# Patient Record
Sex: MALE | Race: BLACK OR AFRICAN AMERICAN | Employment: FULL TIME | URBAN - METROPOLITAN AREA
[De-identification: names, ages, dates, MRNs, and addresses within clinical notes are randomized per-mention and may not be internally consistent; named-entity substitution may affect disease eponyms.]

---

## 2021-09-28 ENCOUNTER — OFFICE VISIT (OUTPATIENT)
Dept: SURGERY | Age: 56
End: 2021-09-28
Payer: OTHER GOVERNMENT

## 2021-09-28 DIAGNOSIS — L91.8 SKIN TAG: Primary | ICD-10-CM

## 2021-09-28 PROCEDURE — 99202 OFFICE O/P NEW SF 15 MIN: CPT | Performed by: SURGERY

## 2021-09-28 NOTE — PROGRESS NOTES
Kay Rasheed is a 60 y.o. male who is referred by [facility] for further evaluation of a skin tag of the right inner thigh. Mr. Teofilo Pickens tells me that he noted a skin lesion on his right inner thigh some time ago. The skin lesion has become progressively larger and more bothersome to him. No associated drainage or bleeding. Found to have a skin tag. He has otherwise been in his usual state of health. Past Medical History:   Diagnosis Date    Skin tag 9/28/2021     History reviewed. No pertinent surgical history. History reviewed. No pertinent family history. Social History     Socioeconomic History    Marital status:      Spouse name: Not on file    Number of children: Not on file    Years of education: Not on file    Highest education level: Not on file     Social Determinants of Health     Financial Resource Strain:     Difficulty of Paying Living Expenses:    Food Insecurity:     Worried About Running Out of Food in the Last Year:     [address] in the Last Year:    Transportation Needs:     Lack of Transportation (Medical):  Lack of Transportation (Non-Medical):    Physical Activity:     Days of Exercise per Week:     Minutes of Exercise per Session:    Stress:     Feeling of Stress :    Social Connections:     Frequency of Communication with Friends and Family:     Frequency of Social Gatherings with Friends and Family:     Attends Rastafari Services:     Active Member of Clubs or Organizations:     Attends Club or Organization Meetings:     Marital Status:      Review of systems negative except as noted. Review of Systems   Musculoskeletal:        Discomfort at site of skin tag. Physical Exam  Vitals reviewed. Constitutional:       General: He is not in acute distress. Appearance: Normal appearance. HENT:      Head: Normocephalic and atraumatic. Eyes:      General: No scleral icterus.   Cardiovascular:      Rate and Rhythm: Normal rate and regular rhythm. Pulmonary:      Effort: Pulmonary effort is normal.      Breath sounds: Normal breath sounds. Musculoskeletal:         General: Normal range of motion. Skin:     Comments: On the right inner thigh, there is a pedunculated skin tag that measures approximately 2 cm x 2 cm. Neurological:      General: No focal deficit present. Mental Status: He is alert. ASSESSMENT and PLAN  Mr. Del Casarez is a 59 yo man with a skin tag of the right inner thigh. In view of the findings on H and P, he should benefit from excision of the skin tag as it is bothersome to him. Discussed procedure with Mr. Del Casarez including risks of bleeding, infection, need for further surgery, recurrence. He understands and wishes to proceed. Mr. Del Casarez will return at his convenience for excision of the skin tag in the office.        CC: [employer]

## 2021-09-30 VITALS
HEART RATE: 87 BPM | DIASTOLIC BLOOD PRESSURE: 78 MMHG | TEMPERATURE: 98.8 F | WEIGHT: 257 LBS | OXYGEN SATURATION: 98 % | RESPIRATION RATE: 16 BRPM | SYSTOLIC BLOOD PRESSURE: 136 MMHG

## 2021-09-30 RX ORDER — CELECOXIB 100 MG/1
CAPSULE ORAL
COMMUNITY
Start: 2021-07-22

## 2021-09-30 RX ORDER — VALACYCLOVIR HYDROCHLORIDE 500 MG/1
TABLET, FILM COATED ORAL 2 TIMES DAILY
COMMUNITY

## 2021-09-30 RX ORDER — GABAPENTIN 300 MG/1
CAPSULE ORAL
COMMUNITY
Start: 2021-06-30

## 2021-10-12 ENCOUNTER — OFFICE VISIT (OUTPATIENT)
Dept: SURGERY | Age: 56
End: 2021-10-12
Payer: OTHER GOVERNMENT

## 2021-10-12 VITALS
OXYGEN SATURATION: 97 % | TEMPERATURE: 98.3 F | SYSTOLIC BLOOD PRESSURE: 136 MMHG | RESPIRATION RATE: 18 BRPM | WEIGHT: 257.5 LBS | BODY MASS INDEX: 34.13 KG/M2 | DIASTOLIC BLOOD PRESSURE: 84 MMHG | HEART RATE: 69 BPM | HEIGHT: 73 IN

## 2021-10-12 DIAGNOSIS — L91.8 SKIN TAG: Primary | ICD-10-CM

## 2021-10-12 PROCEDURE — 99212 OFFICE O/P EST SF 10 MIN: CPT | Performed by: SURGERY

## 2021-10-12 PROCEDURE — 11200 RMVL SKIN TAGS UP TO&INC 15: CPT | Performed by: SURGERY

## 2021-10-12 NOTE — PROGRESS NOTES
Zaida Owens is a 60 y.o. male who returns for excision of a skin tag from his right inner thigh. Mr. Radha Bonilla was last seen on September 28, 2021 for evaluation of a large skin tag on his right inner thigh. Doing well since then. No complaints today. No change in skin tag. Mr. Radha Bonilla would like to proceed with excision of the skin tag. He has otherwise been in his usual state of health. Past Medical History:   Diagnosis Date    Skin tag 9/28/2021     Past Surgical History:   Procedure Laterality Date    HX SHOULDER ARTHROSCOPY      X2     History reviewed. No pertinent family history. Social History     Socioeconomic History    Marital status:      Spouse name: Not on file    Number of children: Not on file    Years of education: Not on file    Highest education level: Not on file   Tobacco Use    Smoking status: Never Smoker    Smokeless tobacco: Never Used   Vaping Use    Vaping Use: Never used   Substance and Sexual Activity    Alcohol use: Not Currently    Drug use: Never    Sexual activity: Yes     Social Determinants of Health     Financial Resource Strain:     Difficulty of Paying Living Expenses:    Food Insecurity:     Worried About Running Out of Food in the Last Year:     [address] in the Last Year:    Transportation Needs:     Lack of Transportation (Medical):  Lack of Transportation (Non-Medical):    Physical Activity:     Days of Exercise per Week:     Minutes of Exercise per Session:    Stress:     Feeling of Stress :    Social Connections:     Frequency of Communication with Friends and Family:     Frequency of Social Gatherings with Friends and Family:     Attends Yarsanism Services:     Active Member of Clubs or Organizations:     Attends Club or Organization Meetings:     Marital Status:      Review of systems negative except as noted. Review of Systems   Musculoskeletal:        Discomfort at site of skin tag.       Physical Exam  Vitals reviewed. Constitutional:       General: He is not in acute distress. Appearance: Normal appearance. HENT:      Head: Normocephalic and atraumatic. Eyes:      General: No scleral icterus. Cardiovascular:      Rate and Rhythm: Normal rate and regular rhythm. Pulmonary:      Effort: Pulmonary effort is normal.      Breath sounds: Normal breath sounds. Abdominal:      General: There is no distension. Palpations: Abdomen is soft. Tenderness: There is no abdominal tenderness. Musculoskeletal:         General: Normal range of motion. Skin:     Comments: No change in the approximately 2 cm x 2 cm pedunculated skin tag on the right inner thigh. Neurological:      General: No focal deficit present. Mental Status: He is alert. ASSESSMENT and PLAN  Mr. Harvey Ruelas is a 59 yo man with skin tag on his right inner thigh. In view of the findings on H and P, he should benefit from excision of the skin tag as it is bothersome to him. Discussed procedure with Mr. Harvey Ruelas including risks of bleeding, infection, need for further surgery, recurrence. He understands and wishes to proceed. Consent on chart.      [facility] [place]  OFFICE PROCEDURE PROGRESS NOTE        Chart reviewed for the following:   Hal Jennings MD, have reviewed the History, Physical and updated the Allergic reactions for [address] performed immediately prior to start of procedure:   Hal Jennings MD, have performed the following reviews on [place] [id number] prior to the start of the procedure:            * Patient was identified by name and date of birth   * Agreement on procedure being performed was verified  * Risks and Benefits explained to the patient  * Procedure site verified and marked as necessary  * Patient was positioned for comfort  * Consent was signed and verified     Time: 1:30 PM      Date of procedure: 10/12/2021    Procedure performed by:  Mark Telles Jackson Tao MD    Provider assisted by: Kim Farias LPN    How tolerated by patient: tolerated the procedure well with no complications    Post Procedural Pain Scale: 0 - No Hurt    Comments: None. Procedure:   Right inner thigh prepped with betadine and draped as a field. Local anesthetic infiltrated. Elliptical incision encompassing skin tag opened sharply. Skin tag and associated skin and subcutaneous tissues excised. Specimen submitted for histopathologic evaluation. Hemostasis with pressure and silver nitrate sticks. Incision closed with interrupted 3-0 Nylon sutures. Antibiotic ointment and dry dressing applied. Told Mr. Emeli Feliciano that he can remove dressing tomorrow and get incision wet. Asked him to keep a dry dressing over the incision. Do not believe that there is a need for antibiotic therapy. Tylenol or Motrin as needed for pain. Will see in one more week or earlier if need be.        CC: [employer]

## 2021-10-18 ENCOUNTER — TELEPHONE (OUTPATIENT)
Dept: SURGERY | Age: 56
End: 2021-10-18

## 2021-10-18 LAB
CPT CODES, 490044: NORMAL
CPT DISCLAIMER: NORMAL
CYTOLOGY SPEC DOC CYTO: NORMAL
DIAGNOSIS SYNOPSIS:: NORMAL
DX ICD CODE: NORMAL
DX ICD CODE: NORMAL
PATH REPORT.GROSS SPEC: NORMAL
PATH REPORT.MICROSCOPIC SPEC OTHER STN: NORMAL
PATH REPORT.RELEVANT HX SPEC: NORMAL
PATHOLOGIST NAME: NORMAL
SPECIMEN SOURCE: NORMAL

## 2021-10-19 ENCOUNTER — OFFICE VISIT (OUTPATIENT)
Dept: SURGERY | Age: 56
End: 2021-10-19
Payer: OTHER GOVERNMENT

## 2021-10-19 VITALS
HEIGHT: 73 IN | DIASTOLIC BLOOD PRESSURE: 86 MMHG | HEART RATE: 78 BPM | SYSTOLIC BLOOD PRESSURE: 124 MMHG | BODY MASS INDEX: 34.19 KG/M2 | RESPIRATION RATE: 16 BRPM | OXYGEN SATURATION: 98 % | TEMPERATURE: 98 F | WEIGHT: 258 LBS

## 2021-10-19 DIAGNOSIS — L91.8 SKIN TAG: Primary | ICD-10-CM

## 2021-10-19 PROCEDURE — 99024 POSTOP FOLLOW-UP VISIT: CPT | Performed by: SURGERY

## 2021-10-19 NOTE — PROGRESS NOTES
1. Have you been to the ER, urgent care clinic since your last visit? Hospitalized since your last visit? No    2. Have you seen or consulted any other health care providers outside of the [address] [place] [address] since your last visit? Include any pap smears or colon screening.  No

## 2021-11-02 ENCOUNTER — OFFICE VISIT (OUTPATIENT)
Dept: SURGERY | Age: 56
End: 2021-11-02
Payer: OTHER GOVERNMENT

## 2021-11-02 VITALS
HEIGHT: 73 IN | RESPIRATION RATE: 18 BRPM | DIASTOLIC BLOOD PRESSURE: 84 MMHG | BODY MASS INDEX: 33.8 KG/M2 | TEMPERATURE: 99 F | OXYGEN SATURATION: 94 % | SYSTOLIC BLOOD PRESSURE: 122 MMHG | WEIGHT: 255 LBS | HEART RATE: 75 BPM

## 2021-11-02 DIAGNOSIS — L91.8 SKIN TAG: Primary | ICD-10-CM

## 2021-11-02 PROCEDURE — 99024 POSTOP FOLLOW-UP VISIT: CPT | Performed by: SURGERY

## 2021-11-02 NOTE — PROGRESS NOTES
1. Have you been to the ER, urgent care clinic since your last visit? Hospitalized since your last visit? no    2. Have you seen or consulted any other health care providers outside of the [address] [place] [address] since your last visit? Include any pap smears or colon screening.  no

## 2021-11-02 NOTE — PROGRESS NOTES
Chioma Munguia is a 60 y.o. male who returns for a wound check. Mr. Michael Covington was last seen on October 19, 2021 for post-operative evaluation. Doing fairly well since then. Mr. Michael Covington tells me that he noted drainage from the wound and was concerned that it was infected. He took some leftover antibiotics that he had at home. No further drainage from wound. No pain at surgical site. No fevers or chills. No nausea or vomitting. He has otherwise been in his usual state of health. Past Medical History:   Diagnosis Date    Skin tag 9/28/2021     Past Surgical History:   Procedure Laterality Date    HX OTHER SURGICAL Right 10/12/2021    excision of a skin tag from his right inner thigh    HX SHOULDER ARTHROSCOPY      X2     History reviewed. No pertinent family history. Social History     Socioeconomic History    Marital status:      Spouse name: Not on file    Number of children: Not on file    Years of education: Not on file    Highest education level: Not on file   Tobacco Use    Smoking status: Never Smoker    Smokeless tobacco: Never Used   Vaping Use    Vaping Use: Never used   Substance and Sexual Activity    Alcohol use: Not Currently    Drug use: Never    Sexual activity: Yes     Social Determinants of Health     Financial Resource Strain:     Difficulty of Paying Living Expenses:    Food Insecurity:     Worried About Running Out of Food in the Last Year:     [address] in the Last Year:    Transportation Needs:     Lack of Transportation (Medical):      Lack of Transportation (Non-Medical):    Physical Activity:     Days of Exercise per Week:     Minutes of Exercise per Session:    Stress:     Feeling of Stress :    Social Connections:     Frequency of Communication with Friends and Family:     Frequency of Social Gatherings with Friends and Family:     Attends Mormonism Services:     Active Member of Clubs or Organizations:     Attends Club or Organization Meetings:  Marital Status:      Review of systems negative except as noted. Review of Systems   Constitutional: Negative for chills and fever. Gastrointestinal: Negative for nausea and vomiting. Musculoskeletal:        Denies pain at surgical site. Physical Exam  Constitutional:       General: He is not in acute distress. Appearance: Normal appearance. He is obese. HENT:      Head: Normocephalic and atraumatic. Musculoskeletal:         General: Normal range of motion. Skin:     Comments: The right inner thigh wound is clean and well healed. No active infection. Neurological:      General: No focal deficit present. Mental Status: He is alert. ASSESSMENT and PLAN  Mr. Mitch Brewster is doing well following excision of a skin tag from his right inner thigh. Reassured Mr. Mitch Brewster that he is doing well and that the right inner thigh wound has healed. Do not believe that there is a need for further antibiotic therapy. Follow up with [facility] as scheduled. Will see as needed.        CC: [facility]

## 2022-03-19 PROBLEM — L91.8 SKIN TAG: Status: ACTIVE | Noted: 2021-09-28

## 2022-05-16 ENCOUNTER — OFFICE VISIT (OUTPATIENT)
Dept: ORTHOPEDIC SURGERY | Age: 57
End: 2022-05-16
Payer: OTHER GOVERNMENT

## 2022-05-16 VITALS — WEIGHT: 250 LBS | HEIGHT: 70 IN | BODY MASS INDEX: 35.79 KG/M2

## 2022-05-16 DIAGNOSIS — M54.12 RADICULOPATHY OF CERVICAL REGION: Primary | ICD-10-CM

## 2022-05-16 PROCEDURE — 99203 OFFICE O/P NEW LOW 30 MIN: CPT | Performed by: ORTHOPAEDIC SURGERY

## 2022-05-16 RX ORDER — GABAPENTIN 300 MG/1
300 CAPSULE ORAL 3 TIMES DAILY
Qty: 60 CAPSULE | Refills: 0 | Status: SHIPPED | OUTPATIENT
Start: 2022-05-16

## 2022-05-16 NOTE — PROGRESS NOTES
Barry Aceves (: 1965) is a 59 y.o. male, patient, here for evaluation of the following chief complaint(s):  Shoulder Pain (left shoulder pain)       HPI:    Patient presents the office today with a chief complaint of left upper extremity pain. Patient is describing discomfort mostly noted in his upper arm area. He describes a discomfort that goes into the back of the scapular region. He also has been noting some numbness and tingling it radiates down to the forearm into his pinky finger. He denies elbow pain. He has had some anterior chest pain. It sounds as if he has had quite the extensive work-up including a cardiac work-up with a CT scan. He is also scheduled to have an MRI evaluation of his shoulder/chest region. He does recall having somewhat of a sharp pain when he was doing some dumbbell pushes. However, the day after he had no pain. He picked up bags of fertilizer without problems and actually played a round of golf with no problem. It was 2 or 3 days after the golf when he started to develop discomfort as mentioned above    No Known Allergies    Current Outpatient Medications   Medication Sig    gabapentin (NEURONTIN) 300 mg capsule     celecoxib (CELEBREX) 100 mg capsule     valACYclovir (Valtrex) 500 mg tablet Take  by mouth two (2) times a day. No current facility-administered medications for this visit. Past Medical History:   Diagnosis Date    Skin tag 2021        Past Surgical History:   Procedure Laterality Date    HX OTHER SURGICAL Right 10/12/2021    excision of a skin tag from his right inner thigh    HX SHOULDER ARTHROSCOPY      X2       No family history on file.      Social History     Socioeconomic History    Marital status:      Spouse name: Not on file    Number of children: Not on file    Years of education: Not on file    Highest education level: Not on file   Occupational History    Not on file   Tobacco Use    Smoking status: Never Smoker    Smokeless tobacco: Never Used   Vaping Use    Vaping Use: Never used   Substance and Sexual Activity    Alcohol use: Not Currently    Drug use: Never    Sexual activity: Yes   Other Topics Concern    Not on file   Social History Narrative    Not on file     Social Determinants of Health     Financial Resource Strain:     Difficulty of Paying Living Expenses: Not on file   Food Insecurity:     Worried About Running Out of Food in the Last Year: Not on file    Cheryle of Food in the Last Year: Not on file   Transportation Needs:     Lack of Transportation (Medical): Not on file    Lack of Transportation (Non-Medical): Not on file   Physical Activity:     Days of Exercise per Week: Not on file    Minutes of Exercise per Session: Not on file   Stress:     Feeling of Stress : Not on file   Social Connections:     Frequency of Communication with Friends and Family: Not on file    Frequency of Social Gatherings with Friends and Family: Not on file    Attends Alevism Services: Not on file    Active Member of [address] [place] [address] or Organizations: Not on file    Attends Club or Organization Meetings: Not on file    Marital Status: Not on file   Intimate Partner Violence:     Fear of Current or Ex-Partner: Not on file    Emotionally Abused: Not on file    Physically Abused: Not on file    Sexually Abused: Not on file   Housing Stability:     Unable to Pay for Housing in the Last Year: Not on file    Number of [place] in the Last Year: Not on file    Unstable Housing in the Last Year: Not on file       Review of Systems   Musculoskeletal:        Left shoulder pain       Vitals:  Ht 5' 10\" (1.778 m)   Wt 250 lb (113.4 kg)   BMI 35.87 kg/m²    Body mass index is 35.87 kg/m². Ortho Exam     Patient is alert and oriented x3. Patient is in no acute distress. Patient ambulates with a nonantalgic gait. Left shoulder: Patient has no asymmetry of the anterior chest wall.   No evidence of pack injury. He has no swelling. He has full range of motion of the shoulder he does have pain through the superior arc of motion but the pain radiates down into the right ulnar forearm region. He has a negative impingement sign and negative Ritter test.  His rotator cuff strength is maintained with forward elevation, lateral abduction as well as external rotation. Cervical spine: Patient has limitation of cervical spine and recurrent pain particularly with cervical extension. He has a positive Spurling's test.  He has normal bicep, tricep and wrist extension strength. Neurovascular examination is intact. ASSESSMENT/PLAN:    Patient presents the office with recurrent upper extremity pain of which is most consistent with cervical radiculitis. He has had x-rays before of his cervical spine shoulder as well as CT scan. I see no reason to repeat these x-rays. As it would appear, I would suggest an MRI evaluation of his cervical spine as he most likely has either multiple level or at least a single level disc herniation. He is currently on Neurontin. I think Neurontin is a good choice.   He is to return to the office after the Samuel Barillas MD

## 2022-05-17 RX ORDER — GABAPENTIN 300 MG/1
300 CAPSULE ORAL 3 TIMES DAILY
Status: CANCELLED | OUTPATIENT
Start: 2022-05-17

## 2022-05-18 ENCOUNTER — HOSPITAL ENCOUNTER (OUTPATIENT)
Dept: MRI IMAGING | Age: 57
Discharge: HOME | End: 2022-05-18
Attending: ORTHOPAEDIC SURGERY
Payer: OTHER GOVERNMENT

## 2022-05-18 DIAGNOSIS — M54.12 RADICULOPATHY OF CERVICAL REGION: ICD-10-CM

## 2022-05-18 PROCEDURE — 72141 MRI NECK SPINE W/O DYE: CPT

## 2022-05-23 ENCOUNTER — OFFICE VISIT (OUTPATIENT)
Dept: ORTHOPEDIC SURGERY | Age: 57
End: 2022-05-23
Payer: OTHER GOVERNMENT

## 2022-05-23 DIAGNOSIS — M54.12 CERVICAL RADICULITIS: Primary | ICD-10-CM

## 2022-05-23 PROCEDURE — 99213 OFFICE O/P EST LOW 20 MIN: CPT | Performed by: ORTHOPAEDIC SURGERY

## 2022-05-23 NOTE — PROGRESS NOTES
Vadim Jimenes (: 1965) is a 59 y.o. male, patient, here for evaluation of the following chief complaint(s):  Shoulder Pain (left shoulder pain)       HPI:    Patient presents the office today with the chief complaint of left upper extremity pain patient is now status post MRI. No Known Allergies    Current Outpatient Medications   Medication Sig    gabapentin (NEURONTIN) 300 mg capsule Take 1 Capsule by mouth three (3) times daily. Max Daily Amount: 900 mg.    gabapentin (NEURONTIN) 300 mg capsule     celecoxib (CELEBREX) 100 mg capsule     valACYclovir (Valtrex) 500 mg tablet Take  by mouth two (2) times a day. No current facility-administered medications for this visit. Past Medical History:   Diagnosis Date    Skin tag 2021        Past Surgical History:   Procedure Laterality Date    HX OTHER SURGICAL Right 10/12/2021    excision of a skin tag from his right inner thigh    HX SHOULDER ARTHROSCOPY      X2       No family history on file. Social History     Socioeconomic History    Marital status:      Spouse name: Not on file    Number of children: Not on file    Years of education: Not on file    Highest education level: Not on file   Occupational History    Not on file   Tobacco Use    Smoking status: Never Smoker    Smokeless tobacco: Never Used   Vaping Use    Vaping Use: Never used   Substance and Sexual Activity    Alcohol use: Not Currently    Drug use: Never    Sexual activity: Yes   Other Topics Concern    Not on file   Social History Narrative    Not on file     Social Determinants of Health     Financial Resource Strain:     Difficulty of Paying Living Expenses: Not on file   Food Insecurity:     Worried About Running Out of Food in the Last Year: Not on file    Cheryle of Food in the Last Year: Not on file   Transportation Needs:     Lack of Transportation (Medical): Not on file    Lack of Transportation (Non-Medical):  Not on file Physical Activity:     Days of Exercise per Week: Not on file    Minutes of Exercise per Session: Not on file   Stress:     Feeling of Stress : Not on file   Social Connections:     Frequency of Communication with Friends and Family: Not on file    Frequency of Social Gatherings with Friends and Family: Not on file    Attends Denominational Services: Not on file    Active Member of Clubs or Organizations: Not on file    Attends Club or Organization Meetings: Not on file    Marital Status: Not on file   Intimate Partner Violence:     Fear of Current or Ex-Partner: Not on file    Emotionally Abused: Not on file    Physically Abused: Not on file    Sexually Abused: Not on file   Housing Stability:     Unable to Pay for Housing in the Last Year: Not on file    Number of [place] in the Last Year: Not on file    Unstable Housing in the Last Year: Not on file       Review of Systems   Musculoskeletal:        Left shoulder pain       Vitals: There were no vitals taken for this visit. There is no height or weight on file to calculate BMI. Ortho Exam     Left shoulder: Patient has no asymmetry of the anterior chest wall. No evidence of pack injury. He has no swelling. He has full range of motion of the shoulder he does have pain through the superior arc of motion but the pain radiates down into the right ulnar forearm region. He has a negative impingement sign and negative Ritter test.  His rotator cuff strength is maintained with forward elevation, lateral abduction as well as external rotation.     Cervical spine: Patient has limitation of cervical spine and recurrent pain particularly with cervical extension. He has a positive Spurling's test.  He has normal bicep, tricep and wrist extension strength. Neurovascular examination is intact. MRI evaluation shows significant cervical degenerative disc disease of the subaxial spine.   There is foraminal stenosis mostly noted at C3-4 and C4-5 as well as some level of C5-6. ASSESSMENT/PLAN:    Findings of the MRI were reviewed with the patient. We talked about different options. At this stage, we will pursue MICHAEL. I will make the referral.  Hopefully this can happen quickly as he has been having some pain with this. If this is not helpful, I would then refer him onto our cervical spine specialist as it may require surgical intervention. Patient is comfortable with this option.         Duran Lubin MD

## 2022-05-24 DIAGNOSIS — M54.12 CERVICAL RADICULITIS: Primary | ICD-10-CM

## 2023-01-15 NOTE — TELEPHONE ENCOUNTER
Called patient to remind him of his upcoming appointment, our cancellation policy, our late policy and no visitor rule. The patient is a 42y Male complaining of

## 2023-01-30 NOTE — PROGRESS NOTES
Dao Allred is a 60 y.o. male who returns for post-operative evaluation. Mr. Cheikh Dacosta is s/p excision of a skin tag from his right inner thigh on October 12, 2021. Doing well since then. Post-operative pain largely resolved. No fevers or chills. No nausea or vomitting. No redness, swelling or drainage at surgical site. He has otherwise been in his usual state of health. Past Medical History:   Diagnosis Date    Skin tag 9/28/2021     Past Surgical History:   Procedure Laterality Date    HX OTHER SURGICAL Right 10/12/2021    excision of a skin tag from his right inner thigh    HX SHOULDER ARTHROSCOPY      X2     History reviewed. No pertinent family history. Social History     Socioeconomic History    Marital status:      Spouse name: Not on file    Number of children: Not on file    Years of education: Not on file    Highest education level: Not on file   Tobacco Use    Smoking status: Never Smoker    Smokeless tobacco: Never Used   Vaping Use    Vaping Use: Never used   Substance and Sexual Activity    Alcohol use: Not Currently    Drug use: Never    Sexual activity: Yes     Social Determinants of Health     Financial Resource Strain:     Difficulty of Paying Living Expenses:    Food Insecurity:     Worried About Running Out of Food in the Last Year:     [address] in the Last Year:    Transportation Needs:     Lack of Transportation (Medical):  Lack of Transportation (Non-Medical):    Physical Activity:     Days of Exercise per Week:     Minutes of Exercise per Session:    Stress:     Feeling of Stress :    Social Connections:     Frequency of Communication with Friends and Family:     Frequency of Social Gatherings with Friends and Family:     Attends [employer] Services:     Active Member of Clubs or Organizations:     Attends Club or Organization Meetings:     Marital Status:      Review of systems negative except as noted.     Review of Systems Constitutional: Negative for chills and fever. Gastrointestinal: Negative for nausea and vomiting. Musculoskeletal:        Minimal discomfort at surgical site. Physical Exam  Vitals reviewed. Constitutional:       Appearance: Normal appearance. He is obese. HENT:      Head: Normocephalic and atraumatic. Cardiovascular:      Rate and Rhythm: Normal rate and regular rhythm. Pulmonary:      Effort: Pulmonary effort is normal.      Breath sounds: Normal breath sounds. Abdominal:      General: There is no distension. Palpations: Abdomen is soft. Tenderness: There is no abdominal tenderness. Musculoskeletal:         General: Normal range of motion. Skin:     Comments: Right inner thigh incision is clean and well healed. Neurological:      General: No focal deficit present. Mental Status: He is alert. ASSESSMENT and PLAN  Skin sutures removed without incident. Upon doing so, the wound edges . No purulent drainage noted. Dry dressing applied. Mr. Mando Helms is doing well following excision of a skin tag from his right inner thigh. Reviewed pathology with Mr. Mando Helms today and reassured him that he is doing well. Asked him to keep a dry dressing over the open wound. Also told him that he can get the open wound wet. Activity as tolerated. Will see in two more weeks or earlier if need be.       CC: [employer] neurology

## 2024-02-20 ENCOUNTER — OFFICE VISIT (OUTPATIENT)
Age: 59
End: 2024-02-20
Payer: OTHER GOVERNMENT

## 2024-02-20 VITALS
BODY MASS INDEX: 35.79 KG/M2 | HEIGHT: 70 IN | OXYGEN SATURATION: 98 % | WEIGHT: 250 LBS | SYSTOLIC BLOOD PRESSURE: 145 MMHG | DIASTOLIC BLOOD PRESSURE: 81 MMHG | TEMPERATURE: 98.7 F | HEART RATE: 81 BPM | RESPIRATION RATE: 20 BRPM

## 2024-02-20 DIAGNOSIS — R29.898 XIPHOID PROMINENCE: Primary | ICD-10-CM

## 2024-02-20 PROCEDURE — 99203 OFFICE O/P NEW LOW 30 MIN: CPT | Performed by: SURGERY

## 2024-02-20 ASSESSMENT — ENCOUNTER SYMPTOMS
ABDOMINAL PAIN: 0
VOMITING: 0
CONSTIPATION: 0
NAUSEA: 0

## 2024-02-20 NOTE — PROGRESS NOTES
Identified patient with two patient identifiers (name and ). Reviewed chart in preparation for visit and have obtained necessary documentation.    Len Watson is a 58 y.o. male  Chief Complaint   Patient presents with    Hernia     BP (!) 145/81 (Site: Right Upper Arm, Position: Sitting, Cuff Size: Large Adult)   Pulse 81   Temp 98.7 °F (37.1 °C) (Oral)   Resp 20   Ht 1.778 m (5' 10\")   Wt 113.4 kg (250 lb)   SpO2 98%   BMI 35.87 kg/m²     1. Have you been to the ER, urgent care clinic since your last visit?  Hospitalized since your last visit?no    2. Have you seen or consulted any other health care providers outside of the [facility] since your last visit?  Include any pap smears or colon screening. No    Patient and provider made aware of elevated BP x2. Patient asymptomatic. Patient reminded to monitor BP, continue to take BP medications if prescribed, and follow up with PCP/Cardiologist.  Patient expressed understanding and agreement.

## 2024-02-20 NOTE — PROGRESS NOTES
Len Watson is a 58 y.o. male who is referred by [facility] for further evaluation of a possible ventral hernia.     Mr. Watson tells me that he recently noted an abdominal wall bulge just inferior to the sternum. No significant change in the size of the bulge. No associated abdominal pain. No nausea or vomiting. No previous abdominal surgery other than appendectomy.   He has otherwise been in his usual state of health.     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 No pertinent family history.    Social History     Socioeconomic History    Marital status:      Spouse name: None    Number of children: None    Years of education: None    Highest education level: None   Tobacco Use    Smoking status: Never    Smokeless tobacco: Never   Substance and Sexual Activity    Alcohol use: Not Currently    Drug use: Never     Review of systems negative except as noted.    Review of Systems   Gastrointestinal:  Negative for abdominal pain, constipation, nausea and vomiting.     Physical Exam  Vitals reviewed.   Constitutional:       General: He is not in acute distress.     Appearance: Normal appearance. He is obese.   HENT:      Head: Normocephalic and atraumatic.   Eyes:      General: No scleral icterus.  Cardiovascular:      Rate and Rhythm: Normal rate and regular rhythm.   Pulmonary:      Effort: Pulmonary effort is normal.      Breath sounds: Normal breath sounds.   Chest:      Comments: The xiphoid is prominent.  Abdominal:      General: There is no distension.      Palpations: Abdomen is soft.      Tenderness: There is no abdominal tenderness. There is no guarding or rebound.      Hernia: No hernia is present. There is no hernia in the ventral area.